# Patient Record
Sex: MALE | Race: WHITE | NOT HISPANIC OR LATINO | URBAN - METROPOLITAN AREA
[De-identification: names, ages, dates, MRNs, and addresses within clinical notes are randomized per-mention and may not be internally consistent; named-entity substitution may affect disease eponyms.]

---

## 2023-03-06 RX ORDER — CHLORHEXIDINE GLUCONATE 213 G/1000ML
1 SOLUTION TOPICAL ONCE
Refills: 0 | Status: DISCONTINUED | OUTPATIENT
Start: 2023-03-07 | End: 2023-03-08

## 2023-03-06 NOTE — H&P PST ADULT - HISTORY OF PRESENT ILLNESS
Department of Cardiology                                                                  [facility]/[place] [address] [address]                                                            Telephone: [phone]. Fax:[phone]                                                                               HPI:  56yo male with h/o HTN, HLD, DM, ex-smoker, right BKA, recent adm to OSH with abnormal ECG, cath 2/10/23 with mRCA 80% lesion and pLAD 60% lesion, no PCI at that time due to DKA and EDD with Cr 1.5, cardiac symptoms stable and ultimately discharged. Now presenting for PCI to be performed by Dr Musa.        Symptoms:        Angina (Class):        Ischemic Symptoms:     Heart Failure: unknown    Echo: no report of recent echo    [facility] 2/10/23:  mRCA 80%  pLAD 60%         Stress Test: Date: none    Associated Risk Factors:        Frailty Assessment: (none/mild/mod/severe)       Cerebrovascular Disease: N/A       Chronic Lung Disease: N/A       Peripheral Arterial Disease: yes, prior BKA       Chronic Kidney Disease (if yes, what is GFR): N/A       Uncontrolled Diabetes (if yes, what is HgbA1C or FBS): N/A       Poorly Controlled Hypertension (if yes, what is SBP): N/A       Morbid Obesity (if yes, what is BMI): N/A       History of Recent Ventricular Arrhythmia: N/A       Inability to Ambulate Safely: N/A       Need for Therapeutic Anticoagulation: N/A       Antiplatelet or Contrast Allergy: N/A    Antianginal Therapies:        Beta Blockers:  Toprol       Calcium Channel Blockers:        Long Acting Nitrates:        Ranexa:                                                                                Department of Cardiology                                                                  [facility]/[place] [address] [address]                                                            Telephone: [phone]. Fax:[phone]                                                                               HPI:  56yo male with h/o HTN, HLD, DM, ex-smoker, right BKA, recent adm to OSH with abnormal ECG, cath 2/10/23 with mRCA 80% lesion and pLAD 60% lesion, no PCI at that time due to DKA and EDD with Cr 1.5, cardiac symptoms stable and ultimately discharged. Now presenting for PCI to be performed by Dr Musa.        Symptoms:        Angina (Class):        Ischemic Symptoms:     Heart Failure: no    Echo 11/23/21:   LVEF 55-60%, no significant VHD    [facility] 2/10/23:  mRCA 80%  pLAD 60%         Stress Test: Date: none    Associated Risk Factors:        Frailty Assessment: (none/mild/mod/severe)       Cerebrovascular Disease: N/A       Chronic Lung Disease: N/A       Peripheral Arterial Disease: yes, prior BKA       Chronic Kidney Disease (if yes, what is GFR): N/A       Uncontrolled Diabetes (if yes, what is HgbA1C or FBS): N/A       Poorly Controlled Hypertension (if yes, what is SBP): N/A       Morbid Obesity (if yes, what is BMI): N/A       History of Recent Ventricular Arrhythmia: N/A       Inability to Ambulate Safely: N/A       Need for Therapeutic Anticoagulation: N/A       Antiplatelet or Contrast Allergy: N/A    Antianginal Therapies:        Beta Blockers:  Toprol       Calcium Channel Blockers:        Long Acting Nitrates:        Ranexa:                                                                                Department of Cardiology                                                                  [facility]/[place] [address] [address]                                                            Telephone: [phone]. Fax:[phone]                                                                               HPI:  58yo male with h/o HTN, HLD, DM, ex-smoker, right BKA, recent adm to OSH with abnormal ECG, cath 2/10/23 with mRCA 80% lesion and pLAD 60% lesion, no PCI at that time due to DKA and EDD with Cr 1.5, cardiac symptoms stable and ultimately discharged. Now presenting for PCI to be performed by Dr Musa. Of note, patient was subsequently readmitted 2/25/2023 x 3days for hyperglycemia/gastropersis.       Symptoms:        Angina (Class): 2       Ischemic Symptoms: chest pain     Heart Failure: no    Echo 11/23/21:   LVEF 55-60%, no significant VHD    LHC 2/10/23:  mRCA 80%  pLAD 60%         Stress Test: Date: none    Associated Risk Factors:        Frailty Assessment: (none/mild/mod/severe)       Cerebrovascular Disease: N/A       Chronic Lung Disease: COPD        Peripheral Arterial Disease: yes, prior BKA       Chronic Kidney Disease (if yes, what is GFR): N/A       Uncontrolled Diabetes (if yes, what is HgbA1C or FBS): YES admission to hospital for uncontrolled DM, MICU on insulin gtt        Poorly Controlled Hypertension (if yes, what is SBP): N/A       Morbid Obesity (if yes, what is BMI): N/A       History of Recent Ventricular Arrhythmia: N/A       Inability to Ambulate Safely: N/A       Need for Therapeutic Anticoagulation: N/A       Antiplatelet or Contrast Allergy: N/A    Antianginal Therapies:        Beta Blockers:  Toprol       Calcium Channel Blockers:        Long Acting Nitrates:        Ranexa:     EKG: NSR 89 bpm    REVIEW OF SYSTEMS:    CONSTITUTIONAL: No weakness, fevers or chills  EYES/ENT: No visual changes;  No vertigo or throat pain   NECK: No pain or stiffness  RESPIRATORY: No cough, wheezing, hemoptysis; No shortness of breath  CARDIOVASCULAR: No chest pain or palpitations  GASTROINTESTINAL: No abdominal or epigastric pain. No nausea, vomiting, or hematemesis; No diarrhea or constipation. No melena or hematochezia.  GENITOURINARY: No dysuria, frequency or hematuria  NEUROLOGICAL: No numbness or weakness  SKIN: No itching, burning, rashes, or lesions   All other review of systems is negative unless indicated above.    PHYSICAL EXAM:    Vital Signs Last 24 Hrs  T(C): 36.6 (07 Mar 2023 13:41), Max: 36.6 (07 Mar 2023 13:41)  T(F): 97.8 (07 Mar 2023 13:41), Max: 97.8 (07 Mar 2023 13:41)  HR: 66 (07 Mar 2023 13:41) (66 - 66)  BP: 137/71 (07 Mar 2023 13:41) (137/71 - 137/71)  BP(mean): --  RR: 16 (07 Mar 2023 13:41) (16 - 16)  SpO2: 98% (07 Mar 2023 13:41) (98% - 98%)    Parameters below as of 07 Mar 2023 13:41  Patient On (Oxygen Delivery Method): room air        GENERAL: Pt lying comfortably, NAD.  ENMT: PERRL, +EOMI.  NECK: soft, Supple, No JVD,   CHEST/LUNG: Clear to auscultatation bilaterally; No wheezing.  HEART: S1S2+, Regular rate and rhythm; No murmurs.  ABDOMEN: Soft, Nontender, Nondistended; Bowel sounds present.  MUSCULOSKELETAL: Normal range of motion.  SKIN: No rashes or lesions.  NEURO: AAOX3, no focal deficits, no motor r sensory loss.  PSYCH: normal mood.  Procedure site:   VASCULAR:   Radial +2 R/+2 L  Femoral +1 R/+2 L  PT right absent BKA/+1 L  DP right absent BKA /+1L                                                                             Department of Cardiology                                                                  [facility]/[place] [address] [address]                                                            Telephone: [phone]. Fax:[phone]                                                                               HPI:  58yo male with h/o HTN, HLD, DM, ex-smoker, right BKA, recent adm to OSH with abnormal ECG, cath 2/10/23 with mRCA 80% lesion and pLAD 60% lesion, no PCI at that time due to DKA and EDD with Cr 1.5, cardiac symptoms stable and ultimately discharged. Now presenting for PCI to be performed by Dr Musa. Of note, patient was subsequently readmitted 2/25/2023 x 3days for hyperglycemia/gastropersis.       Symptoms:        Angina (Class): 2       Ischemic Symptoms: chest pain     Heart Failure: no    Echo 11/23/21:   LVEF 55-60%, no significant VHD    LHC 2/10/23:  mRCA 80%  pLAD 60%         Stress Test: Date: none    Associated Risk Factors:        Frailty Assessment: (none/mild/mod/severe)       Cerebrovascular Disease: N/A       Chronic Lung Disease: COPD        Peripheral Arterial Disease: yes, prior BKA       Chronic Kidney Disease (if yes, what is GFR): N/A       Uncontrolled Diabetes (if yes, what is HgbA1C or FBS): YES admission to hospital for uncontrolled DM, MICU on insulin gtt        Poorly Controlled Hypertension (if yes, what is SBP): N/A       Morbid Obesity (if yes, what is BMI): N/A       History of Recent Ventricular Arrhythmia: N/A       Inability to Ambulate Safely: N/A       Need for Therapeutic Anticoagulation: N/A       Antiplatelet or Contrast Allergy: N/A    Antianginal Therapies:        Beta Blockers:  Toprol       Calcium Channel Blockers:        Long Acting Nitrates:        Ranexa:     EKG: NSR 89 bpm    REVIEW OF SYSTEMS:    CONSTITUTIONAL: No weakness, fevers or chills  EYES/ENT: No visual changes;  No vertigo or throat pain   NECK: No pain or stiffness  RESPIRATORY: No cough, wheezing, hemoptysis; No shortness of breath  CARDIOVASCULAR: No chest pain or palpitations  GASTROINTESTINAL: No abdominal or epigastric pain. No nausea, vomiting, or hematemesis; No diarrhea or constipation. No melena or hematochezia.  GENITOURINARY: No dysuria, frequency or hematuria  NEUROLOGICAL: No numbness or weakness  SKIN: No itching, burning, rashes, or lesions   All other review of systems is negative unless indicated above.    PHYSICAL EXAM:    Vital Signs Last 24 Hrs  T(C): 36.6 (07 Mar 2023 13:41), Max: 36.6 (07 Mar 2023 13:41)  T(F): 97.8 (07 Mar 2023 13:41), Max: 97.8 (07 Mar 2023 13:41)  HR: 66 (07 Mar 2023 13:41) (66 - 66)  BP: 137/71 (07 Mar 2023 13:41) (137/71 - 137/71)  RR: 16 (07 Mar 2023 13:41) (16 - 16)  SpO2: 98% (07 Mar 2023 13:41) (98% - 98%)    Parameters below as of 07 Mar 2023 13:41  Patient On (Oxygen Delivery Method): room air        GENERAL: Pt lying comfortably, NAD.  ENMT: PERRL, +EOMI.  NECK: soft, Supple, No JVD,   CHEST/LUNG: Clear to auscultatation bilaterally; No wheezing.  HEART: S1S2+, Regular rate and rhythm; No murmurs.  ABDOMEN: Soft, Nontender, Nondistended; Bowel sounds present.  MUSCULOSKELETAL: Normal range of motion.  SKIN: No rashes or lesions.  NEURO: AAOX3, no focal deficits, no motor r sensory loss.  PSYCH: normal mood.  Procedure site:   VASCULAR:   Radial +2 R/+2 L  Femoral +1 R/+2 L  PT right absent BKA/+1 L  DP right absent BKA /+1L

## 2023-03-06 NOTE — H&P PST ADULT - ASSESSMENT
Indication:     Risk Stratification:  ASA:  Mallampati:  Bleeding Risk:  Creatinine:  GFR:    Coronary anatomy:    Plan/Recommendations:   -plan for ****  -preferred access: RRA ***  -patient seen and examined  -confirmed appropriate NPO duration  -ECG and Labs reviewed  -Aspirin 81mg po pre-cath***  -NS 250mL IV bolus pre-cath***  -procedure discussed with patient; risks and benefits explained, questions answered  -consent obtained by attending IC    Risks, benefits, and alternatives reviewed.  Risks including but not limited to MI, death, stroke, bleeding, infection, vessel injury, hematoma, renal failure, allergic reaction, urgent open heart surgery, restenosis and stent thrombosis were reviewed.  All questions answered.  Patient is agreeable to proceed.      Indication:  57 year old male with known CAD, s/p cath 2/10/2023 at [facility] mRCA 80%/pLAD60%, now for staged PCI     Risk Stratification:  ASA: 3  Mallampati: 2  Bleeding Risk: 2.0%  Creatinine: 1.60  GFR: 50      Plan/Recommendations:   -plan for [facility]  -preferred access: RRA   -patient seen and examined  -confirmed appropriate NPO duration  -ECG and Labs reviewed  -Aspirin 81mg po pre-cath  -NS 250mL IV bolus pre-cath, followed by 100ml/hr for 1 liter  -procedure discussed with patient; risks and benefits explained, questions answered  -consent obtained by attending IC    Risks, benefits, and alternatives reviewed.  Risks including but not limited to MI, death, stroke, bleeding, infection, vessel injury, hematoma, renal failure, allergic reaction, urgent open heart surgery, restenosis and stent thrombosis were reviewed.  All questions answered.  Patient is agreeable to proceed.      Indication:  57 year old male with known CAD, s/p cath 2/10/2023 at [facility] mRCA 80%/pLAD60%, now for staged PCI     Risk Stratification:  ASA: 3  Mallampati: 2  Bleeding Risk: 2.0%  Creatinine: 1.60  GFR: 50      Plan/Recommendations:   -plan for [facility]  -preferred access: RRA   -patient seen and examined  -confirmed appropriate NPO duration  -ECG and Labs reviewed  -Aspirin 81mg po pre-cath/Loading dose Plavix 600mg po   -NS 250mL IV bolus pre-cath, followed by 100ml/hr for 1 liter  -procedure discussed with patient; risks and benefits explained, questions answered  -consent obtained by attending IC    Risks, benefits, and alternatives reviewed.  Risks including but not limited to MI, death, stroke, bleeding, infection, vessel injury, hematoma, renal failure, allergic reaction, urgent open heart surgery, restenosis and stent thrombosis were reviewed.  All questions answered.  Patient is agreeable to proceed.

## 2023-03-07 ENCOUNTER — INPATIENT (INPATIENT)
Facility: HOSPITAL | Age: 58
LOS: 0 days | Discharge: HOME | DRG: 247 | End: 2023-03-08
Attending: INTERNAL MEDICINE | Admitting: INTERNAL MEDICINE
Payer: MEDICARE

## 2023-03-07 VITALS
TEMPERATURE: 98 F | OXYGEN SATURATION: 98 % | DIASTOLIC BLOOD PRESSURE: 71 MMHG | WEIGHT: 220.02 LBS | RESPIRATION RATE: 16 BRPM | HEART RATE: 66 BPM | SYSTOLIC BLOOD PRESSURE: 137 MMHG | HEIGHT: 70 IN

## 2023-03-07 DIAGNOSIS — I10 ESSENTIAL (PRIMARY) HYPERTENSION: ICD-10-CM

## 2023-03-07 DIAGNOSIS — R94.39 ABNORMAL RESULT OF OTHER CARDIOVASCULAR FUNCTION STUDY: ICD-10-CM

## 2023-03-07 DIAGNOSIS — E78.5 HYPERLIPIDEMIA, UNSPECIFIED: ICD-10-CM

## 2023-03-07 DIAGNOSIS — Z89.511 ACQUIRED ABSENCE OF RIGHT LEG BELOW KNEE: Chronic | ICD-10-CM

## 2023-03-07 DIAGNOSIS — E11.9 TYPE 2 DIABETES MELLITUS WITHOUT COMPLICATIONS: ICD-10-CM

## 2023-03-07 LAB
ABO RH CONFIRMATION: SIGNIFICANT CHANGE UP
ANION GAP SERPL CALC-SCNC: 11 MMOL/L — SIGNIFICANT CHANGE UP (ref 5–17)
BASOPHILS # BLD AUTO: 0.04 K/UL — SIGNIFICANT CHANGE UP (ref 0–0.2)
BASOPHILS NFR BLD AUTO: 0.5 % — SIGNIFICANT CHANGE UP (ref 0–2)
BLD GP AB SCN SERPL QL: SIGNIFICANT CHANGE UP
BUN SERPL-MCNC: 45.9 MG/DL — HIGH (ref 8–20)
CALCIUM SERPL-MCNC: 9.6 MG/DL — SIGNIFICANT CHANGE UP (ref 8.4–10.5)
CHLORIDE SERPL-SCNC: 95 MMOL/L — LOW (ref 96–108)
CO2 SERPL-SCNC: 27 MMOL/L — SIGNIFICANT CHANGE UP (ref 22–29)
CREAT SERPL-MCNC: 1.6 MG/DL — HIGH (ref 0.5–1.3)
EGFR: 50 ML/MIN/1.73M2 — LOW
EOSINOPHIL # BLD AUTO: 0.12 K/UL — SIGNIFICANT CHANGE UP (ref 0–0.5)
EOSINOPHIL NFR BLD AUTO: 1.6 % — SIGNIFICANT CHANGE UP (ref 0–6)
GLUCOSE BLDC GLUCOMTR-MCNC: 160 MG/DL — HIGH (ref 70–99)
GLUCOSE BLDC GLUCOMTR-MCNC: 413 MG/DL — HIGH (ref 70–99)
GLUCOSE BLDC GLUCOMTR-MCNC: 462 MG/DL — CRITICAL HIGH (ref 70–99)
GLUCOSE SERPL-MCNC: 99 MG/DL — SIGNIFICANT CHANGE UP (ref 70–99)
HCT VFR BLD CALC: 32.2 % — LOW (ref 39–50)
HGB BLD-MCNC: 11 G/DL — LOW (ref 13–17)
IMM GRANULOCYTES NFR BLD AUTO: 0.7 % — SIGNIFICANT CHANGE UP (ref 0–0.9)
LYMPHOCYTES # BLD AUTO: 2.55 K/UL — SIGNIFICANT CHANGE UP (ref 1–3.3)
LYMPHOCYTES # BLD AUTO: 34.1 % — SIGNIFICANT CHANGE UP (ref 13–44)
MAGNESIUM SERPL-MCNC: 2.2 MG/DL — SIGNIFICANT CHANGE UP (ref 1.6–2.6)
MCHC RBC-ENTMCNC: 31.3 PG — SIGNIFICANT CHANGE UP (ref 27–34)
MCHC RBC-ENTMCNC: 34.2 GM/DL — SIGNIFICANT CHANGE UP (ref 32–36)
MCV RBC AUTO: 91.5 FL — SIGNIFICANT CHANGE UP (ref 80–100)
MONOCYTES # BLD AUTO: 0.65 K/UL — SIGNIFICANT CHANGE UP (ref 0–0.9)
MONOCYTES NFR BLD AUTO: 8.7 % — SIGNIFICANT CHANGE UP (ref 2–14)
NEUTROPHILS # BLD AUTO: 4.07 K/UL — SIGNIFICANT CHANGE UP (ref 1.8–7.4)
NEUTROPHILS NFR BLD AUTO: 54.4 % — SIGNIFICANT CHANGE UP (ref 43–77)
PLATELET # BLD AUTO: 389 K/UL — SIGNIFICANT CHANGE UP (ref 150–400)
POTASSIUM SERPL-MCNC: 4.4 MMOL/L — SIGNIFICANT CHANGE UP (ref 3.5–5.3)
POTASSIUM SERPL-SCNC: 4.4 MMOL/L — SIGNIFICANT CHANGE UP (ref 3.5–5.3)
RBC # BLD: 3.52 M/UL — LOW (ref 4.2–5.8)
RBC # FLD: 14.3 % — SIGNIFICANT CHANGE UP (ref 10.3–14.5)
SODIUM SERPL-SCNC: 133 MMOL/L — LOW (ref 135–145)
WBC # BLD: 7.48 K/UL — SIGNIFICANT CHANGE UP (ref 3.8–10.5)
WBC # FLD AUTO: 7.48 K/UL — SIGNIFICANT CHANGE UP (ref 3.8–10.5)

## 2023-03-07 PROCEDURE — 93010 ELECTROCARDIOGRAM REPORT: CPT | Mod: 76

## 2023-03-07 RX ORDER — CLOPIDOGREL BISULFATE 75 MG/1
600 TABLET, FILM COATED ORAL ONCE
Refills: 0 | Status: COMPLETED | OUTPATIENT
Start: 2023-03-07 | End: 2023-03-07

## 2023-03-07 RX ORDER — SODIUM CHLORIDE 9 MG/ML
1000 INJECTION INTRAMUSCULAR; INTRAVENOUS; SUBCUTANEOUS
Refills: 0 | Status: DISCONTINUED | OUTPATIENT
Start: 2023-03-07 | End: 2023-03-08

## 2023-03-07 RX ORDER — ASPIRIN/CALCIUM CARB/MAGNESIUM 324 MG
81 TABLET ORAL ONCE
Refills: 0 | Status: COMPLETED | OUTPATIENT
Start: 2023-03-07 | End: 2023-03-07

## 2023-03-07 RX ORDER — DEXTROSE 50 % IN WATER 50 %
15 SYRINGE (ML) INTRAVENOUS ONCE
Refills: 0 | Status: DISCONTINUED | OUTPATIENT
Start: 2023-03-07 | End: 2023-03-08

## 2023-03-07 RX ORDER — DEXTROSE 50 % IN WATER 50 %
25 SYRINGE (ML) INTRAVENOUS ONCE
Refills: 0 | Status: DISCONTINUED | OUTPATIENT
Start: 2023-03-07 | End: 2023-03-08

## 2023-03-07 RX ORDER — QUETIAPINE FUMARATE 200 MG/1
1 TABLET, FILM COATED ORAL
Qty: 0 | Refills: 0 | DISCHARGE

## 2023-03-07 RX ORDER — SODIUM CHLORIDE 9 MG/ML
1000 INJECTION, SOLUTION INTRAVENOUS
Refills: 0 | Status: DISCONTINUED | OUTPATIENT
Start: 2023-03-07 | End: 2023-03-08

## 2023-03-07 RX ORDER — PANTOPRAZOLE SODIUM 20 MG/1
40 TABLET, DELAYED RELEASE ORAL
Refills: 0 | Status: DISCONTINUED | OUTPATIENT
Start: 2023-03-07 | End: 2023-03-08

## 2023-03-07 RX ORDER — MIRTAZAPINE 45 MG/1
45 TABLET, ORALLY DISINTEGRATING ORAL AT BEDTIME
Refills: 0 | Status: DISCONTINUED | OUTPATIENT
Start: 2023-03-07 | End: 2023-03-08

## 2023-03-07 RX ORDER — INSULIN LISPRO 100/ML
VIAL (ML) SUBCUTANEOUS AT BEDTIME
Refills: 0 | Status: DISCONTINUED | OUTPATIENT
Start: 2023-03-07 | End: 2023-03-08

## 2023-03-07 RX ORDER — DEXTROSE 50 % IN WATER 50 %
12.5 SYRINGE (ML) INTRAVENOUS ONCE
Refills: 0 | Status: DISCONTINUED | OUTPATIENT
Start: 2023-03-07 | End: 2023-03-08

## 2023-03-07 RX ORDER — CLOPIDOGREL BISULFATE 75 MG/1
75 TABLET, FILM COATED ORAL DAILY
Refills: 0 | Status: DISCONTINUED | OUTPATIENT
Start: 2023-03-07 | End: 2023-03-08

## 2023-03-07 RX ORDER — FAMOTIDINE 10 MG/ML
20 INJECTION INTRAVENOUS DAILY
Refills: 0 | Status: DISCONTINUED | OUTPATIENT
Start: 2023-03-07 | End: 2023-03-08

## 2023-03-07 RX ORDER — ATORVASTATIN CALCIUM 80 MG/1
40 TABLET, FILM COATED ORAL AT BEDTIME
Refills: 0 | Status: DISCONTINUED | OUTPATIENT
Start: 2023-03-07 | End: 2023-03-08

## 2023-03-07 RX ORDER — INSULIN LISPRO 100/ML
VIAL (ML) SUBCUTANEOUS
Refills: 0 | Status: DISCONTINUED | OUTPATIENT
Start: 2023-03-07 | End: 2023-03-08

## 2023-03-07 RX ORDER — ASPIRIN/CALCIUM CARB/MAGNESIUM 324 MG
81 TABLET ORAL DAILY
Refills: 0 | Status: DISCONTINUED | OUTPATIENT
Start: 2023-03-07 | End: 2023-03-08

## 2023-03-07 RX ORDER — METOPROLOL TARTRATE 50 MG
25 TABLET ORAL DAILY
Refills: 0 | Status: DISCONTINUED | OUTPATIENT
Start: 2023-03-07 | End: 2023-03-08

## 2023-03-07 RX ORDER — INSULIN GLARGINE 100 [IU]/ML
18 INJECTION, SOLUTION SUBCUTANEOUS AT BEDTIME
Refills: 0 | Status: DISCONTINUED | OUTPATIENT
Start: 2023-03-07 | End: 2023-03-08

## 2023-03-07 RX ORDER — GLUCAGON INJECTION, SOLUTION 0.5 MG/.1ML
1 INJECTION, SOLUTION SUBCUTANEOUS ONCE
Refills: 0 | Status: DISCONTINUED | OUTPATIENT
Start: 2023-03-07 | End: 2023-03-08

## 2023-03-07 RX ORDER — BUPROPION HYDROCHLORIDE 150 MG/1
300 TABLET, EXTENDED RELEASE ORAL
Qty: 0 | Refills: 0 | DISCHARGE

## 2023-03-07 RX ORDER — TIOTROPIUM BROMIDE 18 UG/1
2 CAPSULE ORAL; RESPIRATORY (INHALATION) DAILY
Refills: 0 | Status: DISCONTINUED | OUTPATIENT
Start: 2023-03-07 | End: 2023-03-08

## 2023-03-07 RX ORDER — LISINOPRIL 2.5 MG/1
5 TABLET ORAL DAILY
Refills: 0 | Status: DISCONTINUED | OUTPATIENT
Start: 2023-03-07 | End: 2023-03-08

## 2023-03-07 RX ORDER — SODIUM CHLORIDE 9 MG/ML
250 INJECTION INTRAMUSCULAR; INTRAVENOUS; SUBCUTANEOUS ONCE
Refills: 0 | Status: COMPLETED | OUTPATIENT
Start: 2023-03-07 | End: 2023-03-07

## 2023-03-07 RX ORDER — ESCITALOPRAM OXALATE 10 MG/1
30 TABLET, FILM COATED ORAL
Qty: 0 | Refills: 0 | DISCHARGE

## 2023-03-07 RX ADMIN — Medication 81 MILLIGRAM(S): at 14:28

## 2023-03-07 RX ADMIN — MIRTAZAPINE 45 MILLIGRAM(S): 45 TABLET, ORALLY DISINTEGRATING ORAL at 21:26

## 2023-03-07 RX ADMIN — Medication 150 MILLIGRAM(S): at 21:26

## 2023-03-07 RX ADMIN — CLOPIDOGREL BISULFATE 600 MILLIGRAM(S): 75 TABLET, FILM COATED ORAL at 15:06

## 2023-03-07 RX ADMIN — INSULIN GLARGINE 18 UNIT(S): 100 INJECTION, SOLUTION SUBCUTANEOUS at 22:56

## 2023-03-07 RX ADMIN — SODIUM CHLORIDE 250 MILLILITER(S): 9 INJECTION INTRAMUSCULAR; INTRAVENOUS; SUBCUTANEOUS at 14:28

## 2023-03-07 RX ADMIN — SODIUM CHLORIDE 100 MILLILITER(S): 9 INJECTION INTRAMUSCULAR; INTRAVENOUS; SUBCUTANEOUS at 14:28

## 2023-03-07 RX ADMIN — Medication 12: at 21:36

## 2023-03-07 RX ADMIN — ATORVASTATIN CALCIUM 40 MILLIGRAM(S): 80 TABLET, FILM COATED ORAL at 21:26

## 2023-03-07 RX ADMIN — SODIUM CHLORIDE 100 MILLILITER(S): 9 INJECTION INTRAMUSCULAR; INTRAVENOUS; SUBCUTANEOUS at 18:19

## 2023-03-07 NOTE — DISCHARGE NOTE PROVIDER - PROVIDER TOKENS
PROVIDER:[TOKEN:[[id number]],SCHEDULEDAPPT:[03/09/2023]],PROVIDER:[TOKEN:[[id number]],FOLLOWUP:[2 weeks]] PROVIDER:[TOKEN:[[id number]],SCHEDULEDAPPT:[03/09/2023]],PROVIDER:[TOKEN:[[id number]],FOLLOWUP:[2 weeks]],PROVIDER:[TOKEN:[[id number]],FOLLOWUP:[1 week]]

## 2023-03-07 NOTE — DISCHARGE NOTE PROVIDER - CARE PROVIDER_API CALL
Haresh Jordan)  Internal Medicine  [address] [facility], [place] [address]  Phone: [phone]  Fax: [phone]  Scheduled Appointment: 03/09/2023    Ronald Musa)  Cardiovascular Disease; Interventional Cardiology  [address] [place] [address] [address]  [place] [address]  Phone: [phone]  Fax: [phone]  Follow Up Time: 2 weeks   Haresh Jordan (MD)  Internal Medicine  [address] [facility], [place] [address]  Phone: [phone]  Fax: [phone]  Scheduled Appointment: 03/09/2023    Ronald Musa)  Cardiovascular Disease; Interventional Cardiology  [address] [place] [address], [facility] [address]  [place] [address]  Phone: [phone]  Fax: [phone]  Follow Up Time: 2 weeks    TRISTAN BARR  Internal Medicine  [address] [place] [address]  Phone: [phone]  Fax: [phone]  Follow Up Time: 1 week

## 2023-03-07 NOTE — DISCHARGE NOTE PROVIDER - NSDCCPCAREPLAN_GEN_ALL_CORE_FT
PRINCIPAL DISCHARGE DIAGNOSIS  Diagnosis: CAD (coronary artery disease)  Assessment and Plan of Treatment: You had a cardiac catheterization which revealed 8 % proximal to mid  LAD lesion treated with IVUS guided/revascularization and successful placement of 2 drug eluding stents.   It is IMPERATIVE to continue Dual Antiplatelet therapy with Asprin 81mg and Plavix 75mg(clopidigrel) without interrruption to prevent clot formation inside stent.   -Please continue high intensity statin: lipitor 40mg    -Please continue BB with Metoprolol succ 25mg daily   -Please continue Lisinopril with close monitoring of renal funciton. Has appt with Dr. Almanza on 3/9/2023  - cardiac rehab info provided/referral and communication to cardiac rehab completed   -follow up with cardiologist: Dr. Musa      SECONDARY DISCHARGE DIAGNOSES  Diagnosis: Renal insufficiency  Assessment and Plan of Treatment: -please follow up with Dr. ALMANZA to closely follow your renal function.   -serum creatinine 1.6 pre procedure, IVF were provided     PRINCIPAL DISCHARGE DIAGNOSIS  Diagnosis: CAD (coronary artery disease)  Assessment and Plan of Treatment: You had a cardiac catheterization which revealed 8 % proximal to mid  LAD lesion treated with IVUS guided/revascularization and successful placement of 2 drug eluding stents.   It is IMPERATIVE to continue Dual Antiplatelet therapy with Asprin 81mg and Plavix 75mg(clopidigrel) without interrruption to prevent clot formation inside stent.   -Please continue high intensity statin: lipitor 40mg    -Please continue BB with Metoprolol succ 25mg daily   -Please continue Lisinopril with close monitoring of renal funciton. Has appt with Dr. Almanza on 3/9/2023  - cardiac rehab info provided/referral and communication to cardiac rehab completed   -follow up with cardiologist: Dr. Musa      SECONDARY DISCHARGE DIAGNOSES  Diagnosis: Renal insufficiency  Assessment and Plan of Treatment: -please follow up with Dr. ALMANZA to closely follow your renal function.   -serum creatinine 1.6 pre procedure, IVF were provided    Diagnosis: Diabetes  Assessment and Plan of Treatment: -recent hospitalizations for uncontrolled DM, DKA, hyperglycemia requiring insuling infusions. A1c 10.8, strict follow up with endocrinologist, Dr. Roderick Spencer

## 2023-03-07 NOTE — DISCHARGE NOTE PROVIDER - NSDCMRMEDTOKEN_GEN_ALL_CORE_FT
insulin glargine 100 units/mL subcutaneous solution: 15 unit(s) subcutaneous once a day  Lipitor 40 mg oral tablet: 1 tab(s) orally once a day  lisinopril 5 mg oral tablet: 1 tab(s) orally once a day  Lyrica 150 mg oral capsule: 1 cap(s) orally 2 times a day  metoprolol succinate 25 mg oral tablet, extended release: 1 tab(s) orally once a day  mirtazapine 45 mg oral tablet: 1 tab(s) orally once a day (at bedtime)  Pepcid 20 mg oral tablet: 1 tab(s) orally once a day  Percocet 10/325 oral tablet: 1 tab(s) orally every 8 hours  Protonix 40 mg oral delayed release tablet: 1 tab(s) orally once a day  Spiriva 18 mcg inhalation capsule: 1 cap(s) inhaled once a day  SUMAtriptan 50 mg oral tablet: 1 tab(s) orally once  Xanax 1 mg oral tablet: 1 tab(s) orally every 8 hours, As Needed for anxiety    Aspirin Low Dose 81 mg oral tablet, chewable: 1 tab(s) orally once a day  clopidogrel 75 mg oral tablet: 1 tab(s) orally once a day  insulin glargine 100 units/mL subcutaneous solution: 15 unit(s) subcutaneous once a day  Lipitor 40 mg oral tablet: 1 tab(s) orally once a day  lisinopril 5 mg oral tablet: 1 tab(s) orally once a day  Lyrica 150 mg oral capsule: 1 cap(s) orally 2 times a day  metoprolol succinate 25 mg oral tablet, extended release: 1 tab(s) orally once a day  mirtazapine 45 mg oral tablet: 1 tab(s) orally once a day (at bedtime)  Pepcid 20 mg oral tablet: 1 tab(s) orally once a day  Percocet 10/325 oral tablet: 1 tab(s) orally every 8 hours  Protonix 40 mg oral delayed release tablet: 1 tab(s) orally once a day  Spiriva 18 mcg inhalation capsule: 1 cap(s) inhaled once a day  SUMAtriptan 50 mg oral tablet: 1 tab(s) orally once  Xanax 1 mg oral tablet: 1 tab(s) orally every 8 hours, As Needed for anxiety

## 2023-03-07 NOTE — DISCHARGE NOTE PROVIDER - HOSPITAL COURSE
Brief Hospital Course: 56yo male with h/o HTN, HLD, DM, ex-smoker, right BKA, recent adm to OSH with abnormal ECG, cath 2/10/23 with mRCA 80% lesion and pLAD 60% lesion, no PCI at that time due to DKA and EDD with Cr 1.5, cardiac symptoms stable and ultimately discharged. Now presenting for PCI to be performed by Dr Musa. Of note, patient was subsequently readmitted 2/25/2023 x 3days for hyperglycemia/gastropersis. He is now s/p left heart catheterization via RRA approach with Dr. Musa :, tolerated procedure well without complications. Arrived to recovery room NAD and hemodynamically stable, distal pulse +, neurovascular intact     Intraprocedurally: Pt rec'd IV sedation, heparin 17,000 units, and visipaque 132ml  Findings: p/midLAD TRENT x 2(Synergy XD 3.5x38mm and synergy XD 3.5x 20mm) and mid RCA TRENT x1(synergy XD 2.5x48mm)  Post Cath EKG: NSR 83bpm no acute ischemia     -ADMIT due to high risk pci/renal insufficiencies with contrast, IV hydration required  -continue IV hydration NS 0.9% 100ml/hr for 1 Liter  --EKG post cath  -labs and EKG in am  -continue current medical therapy  -insulin sliding scale   -Dual anti platelet therapy with aspirin/ plavix, reinforced importance of strict adherence to DAPT   -statin therapy: lipitor 40nmg daily   -beta blocker: metoprolol succ 25mg daily   -follow up outpt in 2 weeks with Cardiologist  -Lifestyle modifications discussed to reduce cardiovascular risk factors including weight reduction, smoking cessation, medication compliance, and routine follow up with Cardiologist to track your BMI, cholesterol, and glucose levels.   - cardiac rehab info provided/referral and communication to cardiac rehab completed   -Discharge in am if overnight tele, EKG, labs in am all remain WNL       At the time of discharge patient was hemodynamically stable and amenable to all terms of discharge. The patient has received verbal instructions from myself regarding discharge plans.     Length of Discharge: 45MIN    Patient is medically stable and cleared for discharge to home with outpatient follow up.     Brief Hospital Course: 56yo male with h/o HTN, HLD, DM, ex-smoker, right BKA, recent adm to OSH with abnormal ECG, cath 2/10/23 with mRCA 80% lesion and pLAD 60% lesion, no PCI at that time due to DKA and EDD with Cr 1.5, cardiac symptoms stable and ultimately discharged. Now presenting for PCI to be performed by Dr Musa. Of note, patient was subsequently readmitted 2/25/2023 x 3days for hyperglycemia/gastropersis. He is now s/p left heart catheterization via RRA approach with Dr. Musa :, tolerated procedure well without complications. Arrived to recovery room NAD and hemodynamically stable, distal pulse +, neurovascular intact     Intraprocedurally: Pt rec'd IV sedation, heparin 17,000 units, and visipaque 132ml  Findings: p/midLAD TRENT x 2(Synergy XD 3.5x38mm and synergy XD 3.5x 20mm) and mid RCA TRENT x1(synergy XD 2.5x48mm)  Post Cath EKG: NSR 83bpm no acute ischemia     3/8/2023 EKG 86 bpm no changes, tele reviewed without ectopy, Labs reviewed improvement in serum creat from 1.6 to 1.0. A1c 10.8. not at goal, has a follow up with Endocrinologist Dr. Roderick Spencer   -Denies chest pain, sob, dizziness or palps overnight. Right radial site soft, palpable, no hematoma or bleeding     -labs and EKG in am  -continue current medical therapy  -Dual anti platelet therapy with aspirin/ plavix, reinforced importance of strict adherence to DAPT   -statin therapy: lipitor 40nmg daily   -beta blocker: metoprolol succ 25mg daily   -follow up outpt in 2 weeks with Cardiologist  -Lifestyle modifications discussed to reduce cardiovascular risk factors including weight reduction, smoking cessation, medication compliance, and routine follow up with Cardiologist to track your BMI, cholesterol, and glucose levels.   - cardiac rehab info provided/referral and communication to cardiac rehab completed          At the time of discharge patient was hemodynamically stable and amenable to all terms of discharge. The patient has received verbal instructions from myself regarding discharge plans.     Length of Discharge: 45MIN    Patient is medically stable and cleared for discharge to home with outpatient follow up.

## 2023-03-07 NOTE — PROGRESS NOTE ADULT - SUBJECTIVE AND OBJECTIVE BOX
Department of Cardiology                                                                  [facility]/[place] [id number]                                                            Telephone: [phone]. Fax:[phone]                                                    Post- Procedure Note: Left Heart Cardiac Catheterization       Narrative:      56yo male with h/o HTN, HLD, DM, ex-smoker, right BKA, recent adm to OSH with abnormal ECG, cath 2/10/23 with mRCA 80% lesion and pLAD 60% lesion, no PCI at that time due to DKA and EDD with Cr 1.5, cardiac symptoms stable and ultimately discharged. Now presenting for PCI to be performed by Dr Musa. Of note, patient was subsequently readmitted 2/25/2023 x 3days for hyperglycemia/gastropersis. He is now s/p left heart catheterization via RRA approach with Dr. Musa :, tolerated procedure well without complications. Arrived to recovery room NAD and hemodynamically stable, distal pulse +, neurovascular intact       AST MEDICAL & SURGICAL HISTORY:  HTN (hypertension)  HLD (hyperlipidemia)  DM (diabetes mellitus)  S/P BKA (below knee amputation), right      Home Medications:  insulin glargine 100 units/mL subcutaneous solution: 15 unit(s) subcutaneous once a day (07 Mar 2023 18:30)  Lexapro: 30 milligram(s) orally once a day (07 Mar 2023 18:30)  Lipitor 40 mg oral tablet: 1 tab(s) orally once a day (07 Mar 2023 18:30)  lisinopril 5 mg oral tablet: 1 tab(s) orally once a day (07 Mar 2023 18:30)  Lyrica 150 mg oral capsule: 1 cap(s) orally 2 times a day (07 Mar 2023 18:30)  metoprolol succinate 25 mg oral tablet, extended release: 1 tab(s) orally once a day (07 Mar 2023 18:30)  mirtazapine 45 mg oral tablet: 1 tab(s) orally once a day (at bedtime) (07 Mar 2023 18:30)  Pepcid 20 mg oral tablet: 1 tab(s) orally once a day (07 Mar 2023 18:30)  Percocet 10/325 oral tablet: 1 tab(s) orally every 8 hours (07 Mar 2023 18:30)  Protonix 40 mg oral delayed release tablet: 1 tab(s) orally once a day (07 Mar 2023 18:30)  SEROquel 200 mg oral tablet: 1 tab(s) orally 2 times a day (07 Mar 2023 18:30)  Spiriva 18 mcg inhalation capsule: 1 cap(s) inhaled once a day (07 Mar 2023 18:30)  SUMAtriptan 50 mg oral tablet: 1 tab(s) orally once (07 Mar 2023 18:30)  Wellbutrin: 300 milligram(s) orally once a day (07 Mar 2023 18:30)        sulfa drugs (Rash)      Objective:  Vital Signs Last 24 Hrs  T(C): 36.6 (07 Mar 2023 13:41), Max: 36.6 (07 Mar 2023 13:41)  T(F): 97.8 (07 Mar 2023 13:41), Max: 97.8 (07 Mar 2023 13:41)  HR: 76 (07 Mar 2023 18:15) (66 - 81)  BP: 149/70 (07 Mar 2023 18:15) (137/71 - 149/70)  RR: 16 (07 Mar 2023 18:15) (15 - 16)  SpO2: 98% (07 Mar 2023 18:15) (97% - 98%)    Parameters below as of 07 Mar 2023 18:15  Patient On (Oxygen Delivery Method): room air    GENERAL: Pt lying comfortably, NAD.  ENMT: PERRL, +EOMI.  NECK: soft, Supple, No JVD,   CHEST/LUNG: Clear to auscultation bilaterally; No wheezing.  HEART: S1S2+, Regular rate and rhythm; No murmurs.  ABDOMEN: Soft, Nontender, Nondistended; Bowel sounds present.  MUSCULOSKELETAL: Normal range of motion.  SKIN: No rashes or lesions.  NEURO: AAOX3, no focal deficits, no motor r sensory loss.  PSYCH: normal mood.  Procedure site: no signs of bleeding or hematoma, neurovascular intact   VASCULAR:   Radial +2 R/+2 L  Femoral +2 R/+2 L  PT +2 R/+2 L  DP +2 R/+2 L                          11.0   7.48  )-----------( 389      ( 07 Mar 2023 13:34 )             32.2     03-07    133<L>  |  95<L>  |  45.9<H>  ----------------------------<  99  4.4   |  27.0  |  1.60<H>    Ca    9.6      07 Mar 2023 13:34  Mg     2.2     03-07

## 2023-03-07 NOTE — PROGRESS NOTE ADULT - ASSESSMENT
56yo male with h/o HTN, HLD, DM, ex-smoker, right BKA, recent adm to OSH with abnormal ECG, cath 2/10/23 with mRCA 80% lesion and pLAD 60% lesion, no PCI at that time due to DKA and EDD with Cr 1.5, cardiac symptoms stable and ultimately discharged. Now presenting for PCI to be performed by Dr Musa. Of note, patient was subsequently readmitted 2/25/2023 x 3days for hyperglycemia/gastropersis. He is now s/p left heart catheterization via RRA approach with Dr. Musa :, tolerated procedure well without complications. Arrived to recovery room NAD and hemodynamically stable, distal pulse +, neurovascular intact     Intraprocedurally: Pt rec'd IV sedation, heparin 17,000 units, and visipaque 132ml  Findings: p/midLAD TRENT x 2(Synergy XD 3.5x38mm and synergy XD 3.5x 20mm) and mid RCA TRENT x1(synergy XD 2.5x48mm)  Post Cath EKG: NSR 83bpm no acute ischemia     -ADMIT due to high risk pci/renal insufficiencies with contrast, IV hydration required  -continue IV hydration NS 0.9% 100ml/hr for 1 Liter  --EKG post cath  -labs and EKG in am  -continue current medical therapy  -insulin sliding scale   -Dual anti platelet therapy with aspirin/ plavix, reinforced importance of strict adherence to DAPT   -statin therapy: lipitor 40nmg daily   -beta blocker: metoprolol succ 25mg daily   -follow up outpt in 2 weeks with Cardiologist  -Lifestyle modifications discussed to reduce cardiovascular risk factors including weight reduction, smoking cessation, medication compliance, and routine follow up with Cardiologist to track your BMI, cholesterol, and glucose levels.   - cardiac rehab info provided/referral and communication to cardiac rehab completed   -Discharge in am if overnight tele, EKG, labs in am all remain WNL

## 2023-03-08 VITALS — DIASTOLIC BLOOD PRESSURE: 71 MMHG | SYSTOLIC BLOOD PRESSURE: 127 MMHG | RESPIRATION RATE: 16 BRPM | HEART RATE: 87 BPM

## 2023-03-08 LAB
A1C WITH ESTIMATED AVERAGE GLUCOSE RESULT: 10.8 % — HIGH (ref 4–5.6)
ALBUMIN SERPL ELPH-MCNC: 3.9 G/DL — SIGNIFICANT CHANGE UP (ref 3.3–5.2)
ALP SERPL-CCNC: 192 U/L — HIGH (ref 40–120)
ALT FLD-CCNC: 19 U/L — SIGNIFICANT CHANGE UP
ANION GAP SERPL CALC-SCNC: 13 MMOL/L — SIGNIFICANT CHANGE UP (ref 5–17)
AST SERPL-CCNC: 22 U/L — SIGNIFICANT CHANGE UP
BILIRUB SERPL-MCNC: 0.2 MG/DL — LOW (ref 0.4–2)
BUN SERPL-MCNC: 29.7 MG/DL — HIGH (ref 8–20)
CALCIUM SERPL-MCNC: 9.3 MG/DL — SIGNIFICANT CHANGE UP (ref 8.4–10.5)
CHLORIDE SERPL-SCNC: 94 MMOL/L — LOW (ref 96–108)
CHOLEST SERPL-MCNC: 313 MG/DL — HIGH
CO2 SERPL-SCNC: 27 MMOL/L — SIGNIFICANT CHANGE UP (ref 22–29)
CREAT SERPL-MCNC: 1 MG/DL — SIGNIFICANT CHANGE UP (ref 0.5–1.3)
EGFR: 88 ML/MIN/1.73M2 — SIGNIFICANT CHANGE UP
ESTIMATED AVERAGE GLUCOSE: 263 MG/DL — HIGH (ref 68–114)
GLUCOSE BLDC GLUCOMTR-MCNC: 200 MG/DL — HIGH (ref 70–99)
GLUCOSE SERPL-MCNC: 184 MG/DL — HIGH (ref 70–99)
HCT VFR BLD CALC: 33.7 % — LOW (ref 39–50)
HDLC SERPL-MCNC: 35 MG/DL — LOW
HGB BLD-MCNC: 11.4 G/DL — LOW (ref 13–17)
LIPID PNL WITH DIRECT LDL SERPL: SIGNIFICANT CHANGE UP MG/DL
MAGNESIUM SERPL-MCNC: 2.4 MG/DL — SIGNIFICANT CHANGE UP (ref 1.6–2.6)
MCHC RBC-ENTMCNC: 31.3 PG — SIGNIFICANT CHANGE UP (ref 27–34)
MCHC RBC-ENTMCNC: 33.8 GM/DL — SIGNIFICANT CHANGE UP (ref 32–36)
MCV RBC AUTO: 92.6 FL — SIGNIFICANT CHANGE UP (ref 80–100)
NON HDL CHOLESTEROL: 278 MG/DL — HIGH
PLATELET # BLD AUTO: 362 K/UL — SIGNIFICANT CHANGE UP (ref 150–400)
POTASSIUM SERPL-MCNC: 4.7 MMOL/L — SIGNIFICANT CHANGE UP (ref 3.5–5.3)
POTASSIUM SERPL-SCNC: 4.7 MMOL/L — SIGNIFICANT CHANGE UP (ref 3.5–5.3)
PROT SERPL-MCNC: 6.5 G/DL — LOW (ref 6.6–8.7)
RBC # BLD: 3.64 M/UL — LOW (ref 4.2–5.8)
RBC # FLD: 14.3 % — SIGNIFICANT CHANGE UP (ref 10.3–14.5)
SODIUM SERPL-SCNC: 134 MMOL/L — LOW (ref 135–145)
TRIGL SERPL-MCNC: 635 MG/DL — HIGH
WBC # BLD: 6.34 K/UL — SIGNIFICANT CHANGE UP (ref 3.8–10.5)
WBC # FLD AUTO: 6.34 K/UL — SIGNIFICANT CHANGE UP (ref 3.8–10.5)

## 2023-03-08 PROCEDURE — 94640 AIRWAY INHALATION TREATMENT: CPT

## 2023-03-08 PROCEDURE — 93458 L HRT ARTERY/VENTRICLE ANGIO: CPT | Mod: 59

## 2023-03-08 PROCEDURE — C1753: CPT

## 2023-03-08 PROCEDURE — 80061 LIPID PANEL: CPT

## 2023-03-08 PROCEDURE — 85027 COMPLETE CBC AUTOMATED: CPT

## 2023-03-08 PROCEDURE — 80053 COMPREHEN METABOLIC PANEL: CPT

## 2023-03-08 PROCEDURE — 86901 BLOOD TYPING SEROLOGIC RH(D): CPT

## 2023-03-08 PROCEDURE — 36415 COLL VENOUS BLD VENIPUNCTURE: CPT

## 2023-03-08 PROCEDURE — 93005 ELECTROCARDIOGRAM TRACING: CPT

## 2023-03-08 PROCEDURE — 86850 RBC ANTIBODY SCREEN: CPT

## 2023-03-08 PROCEDURE — 82962 GLUCOSE BLOOD TEST: CPT

## 2023-03-08 PROCEDURE — 92978 ENDOLUMINL IVUS OCT C 1ST: CPT | Mod: LD

## 2023-03-08 PROCEDURE — 86900 BLOOD TYPING SEROLOGIC ABO: CPT

## 2023-03-08 PROCEDURE — 93010 ELECTROCARDIOGRAM REPORT: CPT

## 2023-03-08 PROCEDURE — 85025 COMPLETE CBC W/AUTO DIFF WBC: CPT

## 2023-03-08 PROCEDURE — 80048 BASIC METABOLIC PNL TOTAL CA: CPT

## 2023-03-08 PROCEDURE — C9600: CPT | Mod: LD

## 2023-03-08 PROCEDURE — C1887: CPT

## 2023-03-08 PROCEDURE — 83036 HEMOGLOBIN GLYCOSYLATED A1C: CPT

## 2023-03-08 PROCEDURE — C1769: CPT

## 2023-03-08 PROCEDURE — C1894: CPT

## 2023-03-08 PROCEDURE — C1874: CPT

## 2023-03-08 PROCEDURE — C1725: CPT

## 2023-03-08 PROCEDURE — 83735 ASSAY OF MAGNESIUM: CPT

## 2023-03-08 RX ORDER — INSULIN GLARGINE 100 [IU]/ML
15 INJECTION, SOLUTION SUBCUTANEOUS
Qty: 0 | Refills: 0 | DISCHARGE

## 2023-03-08 RX ORDER — CLOPIDOGREL BISULFATE 75 MG/1
1 TABLET, FILM COATED ORAL
Qty: 90 | Refills: 0
Start: 2023-03-08

## 2023-03-08 RX ORDER — ASPIRIN/CALCIUM CARB/MAGNESIUM 324 MG
1 TABLET ORAL
Qty: 90 | Refills: 0
Start: 2023-03-08

## 2023-03-08 RX ORDER — ATORVASTATIN CALCIUM 80 MG/1
1 TABLET, FILM COATED ORAL
Qty: 0 | Refills: 0 | DISCHARGE

## 2023-03-08 RX ORDER — TIOTROPIUM BROMIDE 18 UG/1
1 CAPSULE ORAL; RESPIRATORY (INHALATION)
Qty: 0 | Refills: 0 | DISCHARGE

## 2023-03-08 RX ORDER — OXYCODONE AND ACETAMINOPHEN 5; 325 MG/1; MG/1
1 TABLET ORAL
Qty: 0 | Refills: 0 | DISCHARGE

## 2023-03-08 RX ORDER — PANTOPRAZOLE SODIUM 20 MG/1
1 TABLET, DELAYED RELEASE ORAL
Qty: 0 | Refills: 0 | DISCHARGE

## 2023-03-08 RX ORDER — METOPROLOL TARTRATE 50 MG
1 TABLET ORAL
Qty: 0 | Refills: 0 | DISCHARGE

## 2023-03-08 RX ORDER — SUMATRIPTAN SUCCINATE 4 MG/.5ML
1 INJECTION, SOLUTION SUBCUTANEOUS
Qty: 0 | Refills: 0 | DISCHARGE

## 2023-03-08 RX ORDER — LISINOPRIL 2.5 MG/1
1 TABLET ORAL
Qty: 0 | Refills: 0 | DISCHARGE

## 2023-03-08 RX ORDER — ALPRAZOLAM 0.25 MG
1 TABLET ORAL
Qty: 0 | Refills: 0 | DISCHARGE

## 2023-03-08 RX ORDER — MIRTAZAPINE 45 MG/1
1 TABLET, ORALLY DISINTEGRATING ORAL
Qty: 0 | Refills: 0 | DISCHARGE

## 2023-03-08 RX ORDER — FAMOTIDINE 10 MG/ML
1 INJECTION INTRAVENOUS
Qty: 0 | Refills: 0 | DISCHARGE

## 2023-03-08 RX ADMIN — PANTOPRAZOLE SODIUM 40 MILLIGRAM(S): 20 TABLET, DELAYED RELEASE ORAL at 06:00

## 2023-03-08 RX ADMIN — LISINOPRIL 5 MILLIGRAM(S): 2.5 TABLET ORAL at 05:51

## 2023-03-08 RX ADMIN — FAMOTIDINE 20 MILLIGRAM(S): 10 INJECTION INTRAVENOUS at 08:44

## 2023-03-08 RX ADMIN — Medication 25 MILLIGRAM(S): at 05:51

## 2023-03-08 RX ADMIN — TIOTROPIUM BROMIDE 2 PUFF(S): 18 CAPSULE ORAL; RESPIRATORY (INHALATION) at 08:18

## 2023-03-08 RX ADMIN — Medication 150 MILLIGRAM(S): at 05:51

## 2023-03-08 RX ADMIN — CLOPIDOGREL BISULFATE 75 MILLIGRAM(S): 75 TABLET, FILM COATED ORAL at 08:44

## 2023-03-08 RX ADMIN — Medication 81 MILLIGRAM(S): at 08:44

## 2023-03-08 RX ADMIN — Medication 2: at 08:17

## 2023-03-08 NOTE — DISCHARGE NOTE NURSING/CASE MANAGEMENT/SOCIAL WORK - NSDCPEFALRISK_GEN_ALL_CORE
For information on Fall & Injury Prevention, visit: [email or web address] OR  [email or web address] OR  https://www.cdc.gov/steadi/patient.html
